# Patient Record
Sex: MALE
[De-identification: names, ages, dates, MRNs, and addresses within clinical notes are randomized per-mention and may not be internally consistent; named-entity substitution may affect disease eponyms.]

---

## 2018-03-16 ENCOUNTER — OTHER (OUTPATIENT)
Age: 28
End: 2018-03-16

## 2018-03-16 ENCOUNTER — APPOINTMENT (OUTPATIENT)
Dept: ENDOCRINOLOGY | Facility: CLINIC | Age: 28
End: 2018-03-16
Payer: MEDICAID

## 2018-03-16 VITALS
HEART RATE: 69 BPM | HEIGHT: 69 IN | SYSTOLIC BLOOD PRESSURE: 103 MMHG | DIASTOLIC BLOOD PRESSURE: 58 MMHG | BODY MASS INDEX: 20.88 KG/M2 | WEIGHT: 141 LBS

## 2018-03-16 DIAGNOSIS — Z00.00 ENCOUNTER FOR GENERAL ADULT MEDICAL EXAMINATION W/OUT ABNORMAL FINDINGS: ICD-10-CM

## 2018-03-16 DIAGNOSIS — Z86.69 PERSONAL HISTORY OF OTHER DISEASES OF THE NERVOUS SYSTEM AND SENSE ORGANS: ICD-10-CM

## 2018-03-16 DIAGNOSIS — Z81.8 FAMILY HISTORY OF OTHER MENTAL AND BEHAVIORAL DISORDERS: ICD-10-CM

## 2018-03-16 DIAGNOSIS — D18.01 HEMANGIOMA OF SKIN AND SUBCUTANEOUS TISSUE: ICD-10-CM

## 2018-03-16 PROCEDURE — 99204 OFFICE O/P NEW MOD 45 MIN: CPT

## 2018-04-03 ENCOUNTER — OTHER (OUTPATIENT)
Age: 28
End: 2018-04-03

## 2018-04-12 ENCOUNTER — OTHER (OUTPATIENT)
Age: 28
End: 2018-04-12

## 2018-04-23 ENCOUNTER — APPOINTMENT (OUTPATIENT)
Dept: ENDOCRINOLOGY | Facility: CLINIC | Age: 28
End: 2018-04-23
Payer: MEDICAID

## 2018-04-23 ENCOUNTER — OTHER (OUTPATIENT)
Age: 28
End: 2018-04-23

## 2018-04-23 DIAGNOSIS — R53.83 OTHER FATIGUE: ICD-10-CM

## 2018-04-23 PROCEDURE — 99214 OFFICE O/P EST MOD 30 MIN: CPT

## 2018-04-25 ENCOUNTER — OTHER (OUTPATIENT)
Age: 28
End: 2018-04-25

## 2018-06-12 ENCOUNTER — OTHER (OUTPATIENT)
Age: 28
End: 2018-06-12

## 2018-06-13 ENCOUNTER — OTHER (OUTPATIENT)
Age: 28
End: 2018-06-13

## 2019-09-13 ENCOUNTER — APPOINTMENT (OUTPATIENT)
Dept: MAMMOGRAPHY | Facility: CLINIC | Age: 29
End: 2019-09-13
Payer: MEDICAID

## 2019-09-13 ENCOUNTER — APPOINTMENT (OUTPATIENT)
Dept: ULTRASOUND IMAGING | Facility: CLINIC | Age: 29
End: 2019-09-13
Payer: MEDICAID

## 2019-09-13 ENCOUNTER — OUTPATIENT (OUTPATIENT)
Dept: OUTPATIENT SERVICES | Facility: HOSPITAL | Age: 29
LOS: 1 days | End: 2019-09-13

## 2019-09-13 PROCEDURE — 76641 ULTRASOUND BREAST COMPLETE: CPT | Mod: 26,50

## 2019-09-17 ENCOUNTER — OUTPATIENT (OUTPATIENT)
Dept: OUTPATIENT SERVICES | Facility: HOSPITAL | Age: 29
LOS: 1 days | End: 2019-09-17

## 2019-09-17 ENCOUNTER — RESULT REVIEW (OUTPATIENT)
Age: 29
End: 2019-09-17

## 2019-09-17 ENCOUNTER — APPOINTMENT (OUTPATIENT)
Dept: ULTRASOUND IMAGING | Facility: CLINIC | Age: 29
End: 2019-09-17
Payer: MEDICAID

## 2019-09-17 PROCEDURE — 19083 BX BREAST 1ST LESION US IMAG: CPT | Mod: RT

## 2019-09-18 LAB — SURGICAL PATHOLOGY STUDY: SIGNIFICANT CHANGE UP

## 2021-03-06 ENCOUNTER — OUTPATIENT (OUTPATIENT)
Dept: OUTPATIENT SERVICES | Facility: HOSPITAL | Age: 31
LOS: 1 days | Discharge: HOME | End: 2021-03-06

## 2021-03-06 ENCOUNTER — LABORATORY RESULT (OUTPATIENT)
Age: 31
End: 2021-03-06

## 2021-03-06 DIAGNOSIS — Z11.59 ENCOUNTER FOR SCREENING FOR OTHER VIRAL DISEASES: ICD-10-CM

## 2021-03-09 ENCOUNTER — OUTPATIENT (OUTPATIENT)
Dept: OUTPATIENT SERVICES | Facility: HOSPITAL | Age: 31
LOS: 1 days | Discharge: HOME | End: 2021-03-09
Payer: MEDICAID

## 2021-03-09 PROCEDURE — 95810 POLYSOM 6/> YRS 4/> PARAM: CPT | Mod: 26

## 2021-03-10 DIAGNOSIS — G47.33 OBSTRUCTIVE SLEEP APNEA (ADULT) (PEDIATRIC): ICD-10-CM

## 2022-10-31 ENCOUNTER — APPOINTMENT (OUTPATIENT)
Dept: PLASTIC SURGERY | Facility: CLINIC | Age: 32
End: 2022-10-31
Payer: MEDICARE

## 2022-10-31 PROCEDURE — XXXXX: CPT | Mod: 1L

## 2022-11-02 NOTE — DISCUSSION/SUMMARY
[FreeTextEntry1] : This [facility] patient began transitioning in 2020.\par She has been on hormonal therapy consistently since 2020.\par She has been in therapy and was diagnosed with Gender Dysphoria concerned about certain facial features that appear masculine and cause bullying desires facial feminization surgery followed by Transgender team.\par The following facial features appear masculine and will need to be modified:\par -Brow \par -Nose \par -cheeks \par \par Allergies: \par - Peanuts\par - Season allergies\par \par Medications:\par - Spironolactone\par - Finasteride\par - Estrogen (PO)\par \par PMHx:\par - Sleep apnea\par - Depression/anxiety\par - "little bit of high cholesterol" \par \par Surgical Hx:\par -None\par \par Patient denies previous botox, fillers, silicone injections.\par \par Patient denies marijuana use and denies cigarette use. \par \par + FH: noncontributory \par \par ROS: \par General health / Constitutional no fever, no chills, no unusual weight changes, no energy level changes, no night sweats \par Skin: No color or pigmentation changes, no pruritis, no rashes, no ulcers, \par Hair: No changes in color, texture, distribution, loss Nails No color changes, brittleness, infection \par Head: No headaches, no new jaw pain \par Eyes: Good visual acuity, no color blindness, no corrective lenses, no photophobia, no diplopia, no blurred vision, no infection, pain, no medications, \par Ear: no tinnitus, no discharge, no pain, no medications \par Nose: +septal deviation, nasal obstruction, snoring; no epistaxis, no rhinorrhea, no rhinitis, no pain, \par Mouth & Throat: no gingivitis, no gingival bleeding, no ulcers, no voice changes, no changes in oral mucosa or tongue Neck no stiffness, no pain, no lymphadenitis, no thyroid disorders, \par Respiratory: no cough, no dyspnea, no wheezing, no chest pain, cyanosis, no pneumonia, no asthma, \par Cardiovascular: no chest pain, no palpitations, no irregular rhythm, no tachycardia, no bradycardia, no heart failure, no dyspnea on exertion (BROWN), no edema \par Gastrointestinal: no nausea / vomiting, no dysphagia, no reflux / GERD, no abdominal pain, no jaundice \par Musculoskeletal: no pain in muscles, bones, or joints; no fractures, no dislocations, no muscular weakness, no atrophy \par Neurological: no paresis, no paralysis, no paresthesia, no seizures, no dizziness, no syncope, no ataxia, no tremor \par Psychological: no childhood behavioral problems, no irritability, no sleep changes \par Hematological: no anemia, no bruising, no bleeding tendencies, \par \par PHYSICAL EXAM \par General: WDWN, in no distress, A & O x 3 (person, place, time) \par HEENT Head: AT/NC (atraumatic, normocephalic), including TMJ, sensory and motor; +Prominent brow and lateral orbital rim type III \par Eyes: EOMI, PERRLA \par Ears: exterior, nl hearing, \par Nose: + wide nose, bulbous nasal tip with acute nasiolabial angle, deviated caudal septum \par Throat & mouth: gd palate elevation, nl tongue mobility, nl tonsil size, slightly prominent mandibular angle with active masseter, slightly boxy Wide chin, +Hypoplastic cheeks \par Neck: no masses, nl pulses; no prominent tracheal bulge ("Jean Claude's Apple") \par \par We had a 25 minute meeting with the patient discussing diagnosis and medical management issues and outcomes. \par \par First stage FFS: \par 21139: Frontal sinus anterior wall setback\par 89787: Orbital reconstruction bilateral\par 21363: Browlift bilateral and hairline lowering\par 78790: Supraorbital bar recontouring bilateral\par 90656: Rhinoplasty open\par 73867: Submucous resection of septum\par 70498: Fat grafting to malar regions bilateral from abdomen first 25 ccs\par \par - 21139 (Frontal sinus setback): Previous exposure to testosterone has led this patient to have a male appearing forehead with a more bossed shaped; this is opposed to a female appearing forehead that is more flat. A frontal sinus setback procedure will reshape the anterior wall of the frontal sinus by pushing back the bone and change the contour from ‘convex’ (male-shape) to ‘flat’ (female-shape). This surgical change will create a more flattened feminine brow appearance.\par \par ·       75686 (Browlift): Previous exposure to testosterone has led to the patient having a male ‘M-shaped’ hairline as opposed to a female ‘upside-down U-shaped’ hairline. Her receded hairline also creates a high, broad male appearing forehead. Her low set eyebrows on top of her orbital roof rim give her a harmon, male-appearing look. Both of these male traits: a high hairline and low-set brows are causing her intense feelings of dysphoria. She would benefit from bilateral browlift and hairline lowering procedures. Raising her lateral eyebrow with a bilateral browlift will create a more female appearance. She has stressed a strong desire to wear her own natural hair and does not want to always have to wear a wig to cover up her male features.\par \par ·       22900 (bilateral orbital reconstruction): The bone growth that occurred during testosterone exposure in the upper half of each orbital has caused this patient to have male appearing orbital features that contribute to the sense of dysphoria she feels in public. Orbital reconstruction with a reciprocating saw for an “L-shaped” ostectomy, on both sides will help remove the excessive bony protrusion; this will be followed by bone material grafting and resorbable plate fixation. The bilateral orbital reconstruction will help alleviate these orbital and upper facial male features.\par \par ·       53969 (Supraorbital bar contouring): This patient has orbital lateral hooding or overhang of her lateral frontal bone which is typically associated with the male skull and orbits. Supraorbital contouring of this lateral orbital region with a pineapple zohra will correct this male feature that is causing this patient dysphoria. These procedures also allows us to do a success browlift procedure since the overhang of bone can inhibit the upper movement of the brow and the removal of this allows for an effective lift.\par \par ·       80115 (Rhinoplasty): This patient’s nose has characteristics of a male nose. The male nose is often larger and wider with a more bulbous nasal tip. These male nasal features make her feel masculine which exacerbates her gender dysphoria. A rhinoplasty would be beneficial to feminize her nose by creating a smaller nose and more delicate, softer nasal tip. The lateral dorsal shape will also be feminized by the rhinoplasty.\par \par ·       94588 (Submucous resection of nose): This patient’s nasal septum is shaped like a male septum. The septum is the supportive pole that holds up the structure of the nasal pyramid. In this case the septum will also be used to provide cartilage tissue necessary for nasal grafts. This septoplasty is required to modify the septum to create a straight nose with good functional breathing while taking away the characteristics of a male nose.\par \par ·       15753 (Fat grafting to malar facial regions): This patient had prolonged testosterone exposure resulting in male mid-face features with depressed soft tissues in the cheeks. More fullness in the cheek region may be created with fat grafting from the abdomen or hips to the cheeks creating a more full, feminine appearance. The Erfain fat grafting technique with atraumatic harvest and grafting leads to a 70%+ take of fat grafting to this region and is suggested. This procedure will correct the hypoplastic cheeks region.\par \par \par \par Needs a 3D CT scan and Virtual Surgical Planning. She will need to provide a letter from her therapist and hormone provider. Will need PCP clearance. \par \par Patient seen in conjunction with Dr. Love.

## 2022-11-02 NOTE — REASON FOR VISIT
[Home] : at home, [unfilled] , at the time of the visit. [Medical Office: ([facility])___] : at the medical office located in  [Patient] : the patient

## 2022-12-02 ENCOUNTER — APPOINTMENT (OUTPATIENT)
Dept: PLASTIC SURGERY | Facility: CLINIC | Age: 32
End: 2022-12-02

## 2023-01-18 NOTE — HISTORY OF PRESENT ILLNESS
[FreeTextEntry1] : ARSH SCHOFIELD is a 32 year old male to female transgender patient with a history of gender dysphoria. She seeks consultation for facial feminization surgery. She reports that she has been socially transitioning since 2020 . She has been medically transitioning since 2020 as well. She takes spironolactone, finasteride and estrogen. Her past medical history includes sleep apnea, depression and anxiety. She denies past surgical history includes. She is currently in transgender care at [facility]. She denies tobacco, alcohol, THC and other illicit drugs. Patient denies history of previous gender affirming surgeries and gender affirming procedures such as Botox, silicone, fillers, liposuction and fat grafting. Patient denies personal and family medical history of clots, strokes, bleeding disorders and anesthesia problems. She reports that the male appearance of her face exacerbate her gender dysphoria and cause misgendering.

## 2023-01-20 ENCOUNTER — APPOINTMENT (OUTPATIENT)
Dept: PLASTIC SURGERY | Facility: CLINIC | Age: 33
End: 2023-01-20

## 2023-04-17 ENCOUNTER — APPOINTMENT (OUTPATIENT)
Dept: PLASTIC SURGERY | Facility: CLINIC | Age: 33
End: 2023-04-17
Payer: MEDICARE

## 2023-04-17 PROCEDURE — 99214 OFFICE O/P EST MOD 30 MIN: CPT | Mod: 95

## 2023-04-24 NOTE — DISCUSSION/SUMMARY
[FreeTextEntry1] : Charlette is a 32 year old nonbinary patient that presents on a telehealth today for a follow up appointment to discuss surgery. Patient states that the facial feminization surgery with our team is already scheduled. Charlette scheduled this appointment to mention that they identifies as "Non-binary" and Charlette feels that they might not have mentioned that during their initial new patient appointment. Patient states that being non-binary would affect their facial feminization surgery results and would like to discuss. Patient states that they would not like to lower their hairline, but still continue to contour their brow bone. Also, patient would like to have their functional issues with their nose resolved and be "conservative" with the changes to the appearance of the nose, while also making the nose smaller and more "androgenous feminine". \par \par This [facility] patient began transitioning in 2020.\par Charlette has been on hormonal therapy consistently since 2020.\par Charlette has been in therapy and was diagnosed with Gender Dysphoria concerned about certain facial features that appear masculine and cause bullying desires facial feminization surgery followed by Transgender team.\par The following facial features appear masculine and will need to be modified:\par -Brow \par -Nose \par -cheeks \par \par Allergies: \par - Peanuts\par - Season allergies\par \par Medications:\par - Spironolactone\par - Finasteride\par - Estrogen (PO)\par \par PMHx:\par - Sleep apnea\par - Depression/anxiety\par - "little bit of high cholesterol" \par \par Surgical Hx:\par -None\par \par Patient denies previous botox, fillers, silicone injections.\par \par Patient denies marijuana use and denies cigarette use. \par \par + FH: noncontributory \par \par ROS: \par General health / Constitutional no fever, no chills, no unusual weight changes, no energy level changes, no night sweats \par Skin: No color or pigmentation changes, no pruritis, no rashes, no ulcers, \par Hair: No changes in color, texture, distribution, loss Nails No color changes, brittleness, infection \par Head: No headaches, no new jaw pain \par Eyes: Good visual acuity, no color blindness, no corrective lenses, no photophobia, no diplopia, no blurred vision, no infection, pain, no medications, \par Ear: no tinnitus, no discharge, no pain, no medications \par Nose: +septal deviation, nasal obstruction, snoring; no epistaxis, no rhinorrhea, no rhinitis, no pain, \par Mouth & Throat: no gingivitis, no gingival bleeding, no ulcers, no voice changes, no changes in oral mucosa or tongue Neck no stiffness, no pain, no lymphadenitis, no thyroid disorders, \par Respiratory: no cough, no dyspnea, no wheezing, no chest pain, cyanosis, no pneumonia, no asthma, \par Cardiovascular: no chest pain, no palpitations, no irregular rhythm, no tachycardia, no bradycardia, no heart failure, no dyspnea on exertion (BROWN), no edema \par Gastrointestinal: no nausea / vomiting, no dysphagia, no reflux / GERD, no abdominal pain, no jaundice \par Musculoskeletal: no pain in muscles, bones, or joints; no fractures, no dislocations, no muscular weakness, no atrophy \par Neurological: no paresis, no paralysis, no paresthesia, no seizures, no dizziness, no syncope, no ataxia, no tremor \par Psychological: no childhood behavioral problems, no irritability, no sleep changes \par Hematological: no anemia, no bruising, no bleeding tendencies, \par \par PHYSICAL EXAM \par General: WDWN, in no distress, A & O x 3 (person, place, time) \par HEENT Head: AT/NC (atraumatic, normocephalic), including TMJ, sensory and motor; +Prominent brow and lateral orbital rim type III \par Eyes: EOMI, PERRLA \par Ears: exterior, nl hearing, \par Nose: + wide nose, bulbous nasal tip with acute nasiolabial angle, deviated caudal septum \par Throat & mouth: gd palate elevation, nl tongue mobility, nl tonsil size, slightly prominent mandibular angle with active masseter, slightly boxy Wide chin, +Hypoplastic cheeks \par Neck: no masses, nl pulses; no prominent tracheal bulge ("Jean Claude's Apple") \par \par We had a 25 minute meeting with the patient discussing diagnosis and medical management issues and outcomes. \par \par Plan:\par First stage FFS: \par 21139: Frontal sinus anterior wall setback\par 91550: Orbital reconstruction bilateral\par 05996: Browlift bilateral and hairline lowering\par 44495: Supraorbital bar recontouring bilateral\par 28754: Rhinoplasty open\par 77757: Submucous resection of septum\par 44367: Fat grafting to malar regions bilateral from abdomen first 25 ccs\par \par Patient already has surgery date for her facial feminization surgery with our team.  Needs a 3D CT scan and Virtual Surgical Planning. Will need PCP clearance. Patient will have another opportunity to discuss the surgery with Dr. Love again at their pre-op appointment and also the morning before surgery. Patient was advised to bring / send photos of the type of features/results they are aiming for.\par \par Patient seen in conjunction with Dr. Love. \par \par

## 2023-05-31 ENCOUNTER — OUTPATIENT (OUTPATIENT)
Dept: OUTPATIENT SERVICES | Facility: HOSPITAL | Age: 33
LOS: 1 days | End: 2023-05-31

## 2023-05-31 ENCOUNTER — APPOINTMENT (OUTPATIENT)
Dept: CT IMAGING | Facility: CLINIC | Age: 33
End: 2023-05-31
Payer: MEDICARE

## 2023-05-31 PROCEDURE — 70486 CT MAXILLOFACIAL W/O DYE: CPT | Mod: 26

## 2023-06-02 ENCOUNTER — NON-APPOINTMENT (OUTPATIENT)
Age: 33
End: 2023-06-02

## 2023-06-12 ENCOUNTER — APPOINTMENT (OUTPATIENT)
Dept: PLASTIC SURGERY | Facility: CLINIC | Age: 33
End: 2023-06-12
Payer: MEDICARE

## 2023-06-12 PROCEDURE — 99213 OFFICE O/P EST LOW 20 MIN: CPT

## 2023-06-14 NOTE — DISCUSSION/SUMMARY
[FreeTextEntry1] : Patient presented for preoperative consultation prior to facial feminization surgery\par C/O gender dysphoria documented with letters form hormone provider and therapist\par We reviewed the virtual planning with the patient\par She desires to have the following facial features modified:\par -Brow\par -Nose\par \par We reviewed these procedures and their risks\par  \par FH: noncontributory\par  \par SH: no secondary tobacco use,\par  \par ROS:\par General health / Constitutional\par      no fever, no chills, no unusual weight changes, no energy level changes, no night sweats\par Skin\par       No color or pigmentation changes, no pruritis, no rashes, no ulcers,  \par Hair\par       No changes in color, texture,  distribution, loss\par Nails\par       No color changes, brittleness, infection\par Head\par       No headaches, no new jaw pain\par Eyes\par       Good visual acuity, no color blindness, no corrective lenses, no photophobia, no diplopia, no blurred vision, no infection, pain, no medications, \par Ear\par      no tinnitus, no discharge, no pain, no medications\par Nose\par      no epistaxis, no rhinorrhea, no rhinitis, no pain, \par Mouth & Throat\par      no gingivitis, no gingival bleeding, no ulcers, no voice changes, no changes in oral mucosa or tongue\par Neck\par      no stiffness, no pain, no lymphadenitis, no thyroid disorders, \par Respiratory\par      no cough, no dyspnea, no wheezing,  no chest pain, cyanosis, no pneumonia, no asthma, \par Cardiovascular\par      no chest pain, no palpitations, no irregular rhythm, no tachycardia, no bradycardia, no heart failure, no dyspnea on exertion (BROWN), no edema\par Gastrointestinal\par      no nausea / vomiting, no dysphagia, no reflux / GERD, no abdominal pain, no jaundice\par Musculoskeletal\par      no pain in muscles, bones, or joints; no fractures, no dislocations, no muscular weakness, no atrophy\par Neurological\par      no paresis, no paralysis, no paresthesia, no seizures, no dizziness, no syncope, no ataxia, no tremor\par Psychological\par      no childhood behavioral problems, no irritability, no sleep changes\par Hematological\par      no anemia, no bruising, no bleeding tendencies, \par  \par PHYSICAL EXAM\par General\par WDWN, in no distress,  A & O x 3 (person, place, time) \par HEENT\par Head: +Prominent forehead with bossing Type III by CT scan\par AT/NC (atraumatic, normocephalic), including TMJ, sensory and motor; \par Prominent brow and lateral orbital rim type III\par Eyes: EOMI, PERRLA\par Ears: exterior, nl hearing,\par Nose: slightly wide, bulbous nasal tip with acute nasiolabial angle\par intranasal exam showed enlarged turbinates and deviated caudal septum\par Throat & mouth: gd palate elevation, nl tongue mobility, nl tonsil size\par Prominent mandibular angle with active masseter\par Wide chin\par  \par Neck: no masses, nl pulses\par +excess submental fat\par  \par We had a 25 minute meeting with the patient discussing diagnosis and medical management issues and outcomes.\par First stage FFS:\par -Brow lift\par -frontal sinus setback\par -supraorbital brow recontouring\par -rhinoplasty, SMR, cartilage grafting\par \par Spend 25 minutes with patient discussing the diagnosis and treatment plan. \par Did morphing of nose for communication for 45 minutes\par Patient informed of the timing and risks moving forward.\par All patient questions were answered.\par Patient was given written instructions for care and follow up visit. We discussed the instructions and the patient confirmed an understanding of them.\par Bleeding- It is possible, though unusual, to experience a bleeding episode during or after surgery. Should post-operative bleeding occur, it may require emergency treatment to drain accumulated blood or blood transfusion. Intra-operative blood transfusion may also be required. Do not take any aspirin or anti-inflammatory medications for ten days before or after surgery, as this may increase the risk of bleeding. Non-prescription “herbs” and dietary supplements can increase the risk of surgical bleeding. Hematoma can occur at any time following injury to the breast. If blood transfusions are necessary to treat blood loss, there is the risk of blood-related infections such as hepatitis and HIV (AIDS). Heparin medications that are used to prevent blood clots in veins can produce bleeding and decreased blood platelets. \par \par Infection- Infection is unusual after surgery. Should an infection occur, additional treatment including antibiotics, hospitalization, or additional surgery may be necessary. \par \par Change Skin Sensation- You may experience a diminished (or loss) of sensitivity of the skin of operative area. Partial or permanent loss of skin sensation can occur after surgery. \par Skin Contour Irregularities- Contour and shape irregularities may occur after surgery. Visible and palpable wrinkling may occur. Residual skin irregularities at the ends of the incisions or “dog ears” are always a possibility when there is excessive redundant skin. This may improve with time, or it can be surgically corrected. \par \par Sutures- Most surgical techniques use deep sutures. You may notice these sutures after your surgery. Sutures may spontaneously poke through the skin, become visible or produce irritation that requires suture removal. \par \par Skin Discoloration / Swelling- Some bruising and swelling normally occurs following surgery. The skin in or near the surgical site can appear either lighter or darker than surrounding skin. Although uncommon, swelling and skin discoloration may persist for long periods of time and, in rare situations, may be permanent.  \par \par Skin Sensitivity- Itching, tenderness, or exaggerated responses to hot or cold temperatures may occur after surgery. Usually this resolves during healing, but in rare situations it may be chronic. \par \par Scarring- All surgery leaves scars, some more visible than others. Although good wound healing after a surgical procedure is expected, abnormal scars may occur within the skin and deeper tissues. Scars may be unattractive and of different color than the surrounding skin tone. Scar appearance may also vary within the same scar. Scars may be asymmetrical (appear different on the right and left side of the body). There is the possibility of visible marks in the skin from sutures. In some cases scars may require surgical revision or treatment. \par \par Damage to Deeper Structures- There is the potential for injury to deeper structures including, nerves, blood vessels, muscles during any surgical procedure. The potential for this to occur varies according to the type of procedure being performed. Injury to deeper structures may be temporary or permanent. \par \par Delayed Healing- Wound disruption or delayed wound healing is possible. Some areas of the skin may not heal normally and may take a long time to heal. Areas of skin tissue may die. This may require frequent dressing changes or further surgery to remove the non-healed tissue. Individuals who have decreased blood supply to tissue from past surgery or radiation therapy may be at increased risk for wound healing and poor surgical outcome. Smokers have a greater risk of skin loss and wound healing complications. \par \par Allergic Reactions- In rare cases, local allergies to tape, suture material and glues, blood products, topical preparations or injected agents have been reported. Serious systemic reactions including shock (anaphylaxis) may occur in response to drugs used during surgery and prescription medicines. Allergic reactions may require additional treatment.  \par \par Surgical Anesthesia- Both local and general anesthesia involve risk. There is the possibility of complications, injury, and even death from all forms of surgical anesthesia or sedation. \par Seroma- Infrequently, fluid may accumulate between the skin and the underlying tissues following surgery, trauma or vigorous exercise. Should this problem occur, it may require additional procedures for drainage of fluid.  \par \par Pain- You will experience pain after your surgery. Pain of varying intensity and duration may occur and persist after surgery. Chronic pain may occur very infrequently from nerves becoming trapped in scar tissue or due to tissue stretching.  \par \par \par \par \par \par \par \par

## 2023-06-26 ENCOUNTER — APPOINTMENT (OUTPATIENT)
Dept: PLASTIC SURGERY | Facility: CLINIC | Age: 33
End: 2023-06-26
Payer: MEDICARE

## 2023-06-26 DIAGNOSIS — F64.0 TRANSSEXUALISM: ICD-10-CM

## 2023-06-26 PROCEDURE — 99215 OFFICE O/P EST HI 40 MIN: CPT

## 2023-07-03 PROBLEM — F64.0 GENDER DYSPHORIA IN ADULT: Status: ACTIVE | Noted: 2018-04-23

## 2023-07-03 NOTE — DISCUSSION/SUMMARY
[FreeTextEntry1] : Patient presented for preoperative consultation prior to facial feminization surgery\par She spent over an hour working on morphing images of her face to determine the best rhinoplasty result\par She may want to return to discuss her forehead region\par We reviewed the virtual planning with the patient\par She desires to have the following facial features modified:\par -Brow\par -Nose\par \par We reviewed these procedures and their risks\par  \par FH: noncontributory\par  \par SH: no secondary tobacco use,\par  \par ROS:\par General health / Constitutional\par      no fever, no chills, no unusual weight changes, no energy level changes, no night sweats\par Skin\par       No color or pigmentation changes, no pruritis, no rashes, no ulcers,  \par Hair\par       No changes in color, texture,  distribution, loss\par Nails\par       No color changes, brittleness, infection\par Head\par       No headaches, no new jaw pain\par Eyes\par       Good visual acuity, no color blindness, no corrective lenses, no photophobia, no diplopia, no blurred vision, no infection, pain, no medications, \par Ear\par      no tinnitus, no discharge, no pain, no medications\par Nose\par      no epistaxis, no rhinorrhea, no rhinitis, no pain, \par Mouth & Throat\par      no gingivitis, no gingival bleeding, no ulcers, no voice changes, no changes in oral mucosa or tongue\par Neck\par      no stiffness, no pain, no lymphadenitis, no thyroid disorders, \par Respiratory\par      no cough, no dyspnea, no wheezing,  no chest pain, cyanosis, no pneumonia, no asthma, \par Cardiovascular\par      no chest pain, no palpitations, no irregular rhythm, no tachycardia, no bradycardia, no heart failure, no dyspnea on exertion (BROWN), no edema\par Gastrointestinal\par      no nausea / vomiting, no dysphagia, no reflux / GERD, no abdominal pain, no jaundice\par Musculoskeletal\par      no pain in muscles, bones, or joints; no fractures, no dislocations, no muscular weakness, no atrophy\par Neurological\par      no paresis, no paralysis, no paresthesia, no seizures, no dizziness, no syncope, no ataxia, no tremor\par Psychological\par      no childhood behavioral problems, no irritability, no sleep changes\par Hematological\par      no anemia, no bruising, no bleeding tendencies, \par  \par PHYSICAL EXAM\par General\par WDWN, in no distress,  A & O x 3 (person, place, time) \par HEENT\par Head: AT/NC (atraumatic, normocephalic), including TMJ, sensory and motor; \par Prominent brow and lateral orbital rim type III\par Eyes: EOMI, PERRLA\par Ears: exterior, nl hearing,\par Nose: slightly wide, bulbous nasal tip with acute nasiolabial angle\par intranasal exam showed enlarged turbinates and deviated caudal septum\par Throat & mouth: gd palate elevation, nl tongue mobility, nl tonsil size\par Prominent mandibular angle with active masseter\par Wide chin\par  \par Neck: no masses, nl pulses\par +excess submental fat\par  \par We had a 25 minute meeting with the patient discussing diagnosis and medical management issues and outcomes.\par First stage FFS:\par -Brow lift\par -frontal sinus setback\par -supraorbital brow recontouring\par -rhinoplasty, SMR, cartilage grafting\par \par \par Spend 25 minutes with patient discussing the diagnosis and treatment plan. \par Patient informed of the timing and risks moving forward.\par All patient questions were answered.\par Patient was given written instructions for care and follow up visit. We discussed the instructions and the patient confirmed an understanding of them.\par \par \par \par \par \par

## 2023-07-07 ENCOUNTER — APPOINTMENT (OUTPATIENT)
Dept: PLASTIC SURGERY | Facility: CLINIC | Age: 33
End: 2023-07-07

## 2023-07-18 ENCOUNTER — APPOINTMENT (OUTPATIENT)
Dept: PLASTIC SURGERY | Facility: HOSPITAL | Age: 33
End: 2023-07-18

## 2023-08-11 ENCOUNTER — APPOINTMENT (OUTPATIENT)
Dept: PLASTIC SURGERY | Facility: CLINIC | Age: 33
End: 2023-08-11

## 2024-09-04 ENCOUNTER — APPOINTMENT (OUTPATIENT)
Dept: ORTHOPEDIC SURGERY | Age: 34
End: 2024-09-04
Payer: MEDICARE

## 2024-09-04 VITALS
HEIGHT: 69 IN | DIASTOLIC BLOOD PRESSURE: 67 MMHG | SYSTOLIC BLOOD PRESSURE: 108 MMHG | BODY MASS INDEX: 24.44 KG/M2 | WEIGHT: 165 LBS | TEMPERATURE: 97.2 F | HEART RATE: 74 BPM | OXYGEN SATURATION: 98 %

## 2024-09-04 DIAGNOSIS — M54.2 CERVICALGIA: ICD-10-CM

## 2024-09-04 PROCEDURE — 72050 X-RAY EXAM NECK SPINE 4/5VWS: CPT

## 2024-09-04 PROCEDURE — 99205 OFFICE O/P NEW HI 60 MIN: CPT | Mod: 25

## 2024-09-04 NOTE — HISTORY OF PRESENT ILLNESS
[de-identified] : 33-year-old male presents as new patient for evaluation of neck pain.  States this began approximately 5 to 6 years ago had some significant forward head posture which was corrected with physical therapy chiropractic treatment.  Describes a general sense of soreness in the cervical thoracic junction bilaterally into the trapezial region.  This diagnosis of chronic fatigue syndrome and often lies down to work on his computer in an awkward posture.

## 2024-09-04 NOTE — PHYSICAL EXAM
[UE/LE] : Sensory: Intact in bilateral upper & lower extremities [Normal DTR Reflexes] : DTR reflexes normal [Normal Proprioception] : sensation intact for proprioception [Normal] : No costovertebral angle tenderness and no spinal tenderness [de-identified] : 4 view cervical spine x-ray 9/4/2024 PACS Some reversal of resting cervical lordosis.  Active extension to neutral.  No evidence of instability or spondylosis

## 2024-09-04 NOTE — DISCUSSION/SUMMARY
[de-identified] : I had a thorough discussion with the patient regarding my review of the available imaging and his symptoms.  I do not feel there is any structural abnormality within the cervical spine.  I recommended a course of physical therapy for muscular mobilization and strengthening and postural correction.  He will keep us informed of his progress. 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

## 2024-09-11 ENCOUNTER — APPOINTMENT (OUTPATIENT)
Dept: ORTHOPEDIC SURGERY | Facility: CLINIC | Age: 34
End: 2024-09-11

## 2024-11-01 ENCOUNTER — APPOINTMENT (OUTPATIENT)
Dept: ORTHOPEDIC SURGERY | Facility: CLINIC | Age: 34
End: 2024-11-01

## 2025-04-24 ENCOUNTER — NON-APPOINTMENT (OUTPATIENT)
Age: 35
End: 2025-04-24

## 2025-04-25 ENCOUNTER — APPOINTMENT (OUTPATIENT)
Dept: ORTHOPEDIC SURGERY | Facility: CLINIC | Age: 35
End: 2025-04-25
Payer: MEDICARE

## 2025-04-25 DIAGNOSIS — M54.50 LOW BACK PAIN, UNSPECIFIED: ICD-10-CM

## 2025-04-25 PROCEDURE — 99213 OFFICE O/P EST LOW 20 MIN: CPT | Mod: 93

## 2025-06-25 ENCOUNTER — APPOINTMENT (OUTPATIENT)
Dept: OTOLARYNGOLOGY | Facility: CLINIC | Age: 35
End: 2025-06-25

## 2025-07-11 ENCOUNTER — APPOINTMENT (OUTPATIENT)
Dept: OTOLARYNGOLOGY | Facility: CLINIC | Age: 35
End: 2025-07-11